# Patient Record
Sex: FEMALE | Race: WHITE | NOT HISPANIC OR LATINO | URBAN - METROPOLITAN AREA
[De-identification: names, ages, dates, MRNs, and addresses within clinical notes are randomized per-mention and may not be internally consistent; named-entity substitution may affect disease eponyms.]

---

## 2019-10-05 ENCOUNTER — EMERGENCY (EMERGENCY)
Facility: HOSPITAL | Age: 34
LOS: 1 days | Discharge: HOME | End: 2019-10-05
Attending: EMERGENCY MEDICINE | Admitting: EMERGENCY MEDICINE
Payer: COMMERCIAL

## 2019-10-05 VITALS
SYSTOLIC BLOOD PRESSURE: 127 MMHG | HEART RATE: 74 BPM | OXYGEN SATURATION: 99 % | TEMPERATURE: 98 F | DIASTOLIC BLOOD PRESSURE: 83 MMHG | RESPIRATION RATE: 18 BRPM

## 2019-10-05 VITALS
OXYGEN SATURATION: 100 % | RESPIRATION RATE: 18 BRPM | DIASTOLIC BLOOD PRESSURE: 79 MMHG | HEART RATE: 68 BPM | WEIGHT: 149.91 LBS | SYSTOLIC BLOOD PRESSURE: 126 MMHG | TEMPERATURE: 98 F

## 2019-10-05 PROCEDURE — 99283 EMERGENCY DEPT VISIT LOW MDM: CPT

## 2019-10-05 PROCEDURE — 99284 EMERGENCY DEPT VISIT MOD MDM: CPT | Mod: 25

## 2019-10-05 RX ORDER — HYDROCORTISONE 1 %
1 OINTMENT (GRAM) TOPICAL
Qty: 30 | Refills: 0
Start: 2019-10-05 | End: 2019-10-18

## 2019-10-05 NOTE — ED PROVIDER NOTE - PATIENT PORTAL LINK FT
You can access the FollowMyHealth Patient Portal offered by [facility] by registering at the following website: [email or web address]. By joining [facility]’s FollowMyHealth portal, you will also be able to view your health information using other applications (apps) compatible with our system.

## 2019-10-05 NOTE — ED PROVIDER NOTE - EYES, MLM
Last Written Prescription Date:  Diclofenac 12/3/2018  Last Fill Quantity: 30,  # refills: 0   Last office visit: 12/3/2018 with prescribing provider:  Javi   Future Office Visit:      Pt is out of this medication and would like it sent to the [facility].  Yenny Castillo  [facility] [place] [facility]    
Clear bilaterally, pupils equal, round and reactive to light.

## 2019-10-05 NOTE — ED PROVIDER NOTE - CLINICAL SUMMARY MEDICAL DECISION MAKING FREE TEXT BOX
35 y/o F visiting from [place] with large, prolapsed hemorrhoid, pt uncomfortable. No indication of thrombosis, consult surgery, f/u recommendations. 33 y/o F visiting from [place] with large, prolapsed hemorrhoid, pt uncomfortable. No indication of thrombosis. Plan for surgery consult, f/u recommendations.

## 2019-10-05 NOTE — ED ADULT NURSE NOTE - OBJECTIVE STATEMENT
Patient c/o of 4 days rectal pain and irritation w/ mild bleeding, no abdominal pain, nausea/vomitting, no dizziness, states has Hx. of hemorrhoids.

## 2019-10-05 NOTE — ED PROVIDER NOTE - OBJECTIVE STATEMENT
35 y/o F with PMHx hemorrhoids, visiting from [place], presents to the ED with hemorrhoid associated with rectal pain and mild bleeding. Pt states she frequently gets constipated and pushed too much, causing the hemorrhoid. Pt used Preparation H and ice with no relief. Denies abdominal pain, nausea, vomiting, or dizziness. 33 y/o F with PMHx hemorrhoids, visiting from [place], presents to the ED with hemorrhoid associated with rectal pain and mild bleeding. Pt states she frequently gets constipated and strained while having a BM, causing the hemorrhoid. Pt used Preparation H and ice with no relief. Denies abdominal pain, nausea, vomiting, or dizziness.

## 2019-10-05 NOTE — CONSULT NOTE ADULT - ASSESSMENT
34 F with stage 3 internal hemorrhoids. Explained to her that she should continue to take high fiber diet, sitz baths and follow up with a colorectal/ general surgeon in [place] as no immediate surgical intervention is warranted.     patient seen and discussed with chief resident and attending 34 F with stage 3 internal hemorrhoids.       Recommend 45 g tube of Lotrisone to be used twice daily for 3 weeks   F/u with medical doctor in [place] to get referred to a surgeon for further evaluation  patient seen and discussed with chief resident and attending

## 2019-10-05 NOTE — ED PROVIDER NOTE - ATTENDING CONTRIBUTION TO CARE
painful prolapsed internal hemorrhoid, unable to reduce.  does not appear infected or thrombosed.  colorectal consulted and rec conservative management/ outpatient f/u.

## 2019-10-05 NOTE — ED PROVIDER NOTE - PROGRESS NOTE DETAILS
surgery reports no intervention indicated in ED, will rx steroid rectal cream, use stool softener/laxative

## 2019-10-05 NOTE — ED PROVIDER NOTE - NSFOLLOWUPINSTRUCTIONS_ED_ALL_ED_FT
Hemorrhoids    WHAT YOU NEED TO KNOW:    Hemorrhoids are swollen blood vessels inside your rectum (internal hemorrhoids) or on your anus (external hemorrhoids). Sometimes a hemorrhoid may prolapse. This means it extends out of your anus.    DISCHARGE INSTRUCTIONS:    Return to the emergency department if:     You have severe pain in your rectum or around your anus.      You have severe pain in your abdomen and you are vomiting.       You have bleeding from your anus that soaks through your underwear.     Contact your healthcare provider if:     You have frequent and painful bowel movements.      Your hemorrhoid looks or feels more swollen than usual.       You do not have a bowel movement for 2 days or more.       You see or feel tissue coming through your anus.       You have questions or concerns about your condition or care.    Medicines: You may need any of the following:     A pad, cream, or ointment can help decrease pain, swelling, and itching.       Stool softeners help treat or prevent constipation.       NSAIDs, such as ibuprofen, help decrease swelling, pain, and fever. NSAIDs can cause stomach bleeding or kidney problems in certain people. If you take blood thinner medicine, always ask your healthcare provider if NSAIDs are safe for you. Always read the medicine label and follow directions.      Take your medicine as directed. Contact your healthcare provider if you think your medicine is not helping or if you have side effects. Tell him or her if you are allergic to any medicine. Keep a list of the medicines, vitamins, and herbs you take. Include the amounts, and when and why you take them. Bring the list or the pill bottles to follow-up visits. Carry your medicine list with you in case of an emergency.    Manage your symptoms:     Apply ice on your anus for 15 to 20 minutes every hour or as directed. Use an ice pack, or put crushed ice in a plastic bag. Cover it with a towel before you apply it to your anus. Ice helps prevent tissue damage and decreases swelling and pain.      Take a sitz bath. Fill a bathtub with 4 to 6 inches of warm water. You may also use a sitz bath pan that fits inside a toilet bowl. Sit in the sitz bath for 15 minutes. Do this 3 times a day, and after each bowel movement. The warm water can help decrease pain and swelling.       Keep your anal area clean. Gently wash the area with warm water daily. Soap may irritate the area. After a bowel movement, wipe with moist towelettes or wet toilet paper. Dry toilet paper can irritate the area.     Prevent hemorrhoids:     Do not strain to have a bowel movement. Do not sit on the toilet too long. These actions can increase pressure on the tissues in your rectum and anus.       Drink plenty of liquids. Liquids can help prevent constipation. Ask how much liquid to drink each day and which liquids are best for you.       Eat a variety of high-fiber foods. Examples include fruits, vegetables, and whole grains. Ask your healthcare provider how much fiber you need each day. You may need to take a fiber supplement.            Exercise as directed. Exercise, such as walking, may make it easier to have a bowel movement. Ask your healthcare provider to help you create an exercise plan.       Do not have anal sex. Anal sex can weaken the skin around your rectum and anus.       Avoid heavy lifting. This can cause straining and increase your risk for another hemorrhoid.     Follow up with your healthcare provider as directed: Write down your questions so you remember to ask them during your visits

## 2019-10-05 NOTE — CONSULT NOTE ADULT - SUBJECTIVE AND OBJECTIVE BOX
34 F who is traveling from [place] to visit [place] presents with prolapsed internal hemorrhoids. Symptoms of prolapse, itching and occasional per rectal bleeding have caused her to seek attention. She denies significant pain and reports irritation from the prolapse after long periods of walking. Her PMHx is significant for anxiety. PSHx includes a laparoscopic appendectomy four years ago. She is a smoker. She did visit her PCP and was advised to take a high fiber diet. She is leaving for [place] in the coming week. She has never seen a colorectal/general surgeon for hemorrhoids.    General: No acute distress  Neurology: Awake  Eyes: Scleras clear  Respiratory: Normal work of breathing  CV: RRR  Abdominal: Soft, NT, laparoscopy incisions well healed  KALLIE: stage 3 internal hemorrhoid, no external hemorrhoids  Extremities: No edema  Psych: Oriented    Vital Signs Last 24 Hrs  T(C): 36.6 (05 Oct 2019 18:29), Max: 36.6 (05 Oct 2019 18:29)  T(F): 97.9 (05 Oct 2019 18:29), Max: 97.9 (05 Oct 2019 18:29)  HR: 74 (05 Oct 2019 19:52) (68 - 74)  BP: 127/83 (05 Oct 2019 19:52) (126/79 - 127/83)  BP(mean): --  RR: 18 (05 Oct 2019 19:52) (18 - 18)  SpO2: 99% (05 Oct 2019 19:52) (99% - 100%)

## 2019-10-12 DIAGNOSIS — K64.8 OTHER HEMORRHOIDS: ICD-10-CM

## 2023-12-28 NOTE — ED PROVIDER NOTE - CONSTITUTIONAL, MLM
Patient seen and examined    Case discussed with Dr Lopez    I agree with his interval history exam and plans as noted above    Stable for discharge on oral quinolone    follow up in office in 2 weeks    Chris Jason MD  Can be called via Teams  After 5pm/weekends [phone] Patient seen and examined    Case discussed with Dr Lopez    I agree with his interval history exam and plans as noted above    Stable for discharge on oral quinolone    follow up in office in 2 weeks    Chris Jason MD  Can be called via Teams  After 5pm/weekends [phone] normal... Well appearing, well nourished, awake, alert, oriented to person, place, time/situation and in no apparent distress.